# Patient Record
Sex: MALE | Race: WHITE
[De-identification: names, ages, dates, MRNs, and addresses within clinical notes are randomized per-mention and may not be internally consistent; named-entity substitution may affect disease eponyms.]

---

## 2018-01-17 ENCOUNTER — HOSPITAL ENCOUNTER (OUTPATIENT)
Dept: HOSPITAL 19 - COL.LAB | Age: 83
End: 2018-01-17
Attending: ORTHOPAEDIC SURGERY
Payer: MEDICARE

## 2018-01-17 DIAGNOSIS — Z01.812: Primary | ICD-10-CM

## 2019-12-16 ENCOUNTER — HOSPITAL ENCOUNTER (OUTPATIENT)
Dept: HOSPITAL 19 - EUO | Age: 84
Discharge: HOME | End: 2019-12-16
Attending: FAMILY MEDICINE
Payer: MEDICARE

## 2019-12-16 VITALS — BODY MASS INDEX: 43.58 KG/M2 | WEIGHT: 271.17 LBS | HEIGHT: 65.98 IN

## 2019-12-16 VITALS — HEART RATE: 89 BPM | DIASTOLIC BLOOD PRESSURE: 91 MMHG | SYSTOLIC BLOOD PRESSURE: 156 MMHG

## 2019-12-16 VITALS — HEART RATE: 81 BPM | DIASTOLIC BLOOD PRESSURE: 69 MMHG | SYSTOLIC BLOOD PRESSURE: 136 MMHG

## 2019-12-16 VITALS — TEMPERATURE: 97.6 F | HEART RATE: 95 BPM | SYSTOLIC BLOOD PRESSURE: 105 MMHG | DIASTOLIC BLOOD PRESSURE: 71 MMHG

## 2019-12-16 DIAGNOSIS — I95.9: Primary | ICD-10-CM

## 2019-12-16 DIAGNOSIS — E86.0: ICD-10-CM

## 2019-12-16 LAB
ALBUMIN SERPL-MCNC: 3.4 GM/DL (ref 3.5–5)
ALP SERPL-CCNC: 72 U/L (ref 50–136)
ALT SERPL-CCNC: 32 U/L (ref 21–72)
ANION GAP SERPL CALC-SCNC: 8 MMOL/L (ref 7–16)
AST SERPL-CCNC: 25 U/L (ref 15–37)
BILIRUB SERPL-MCNC: 0.5 MG/DL (ref 0–1)
BUN SERPL-MCNC: 23 MG/DL (ref 9–20)
CALCIUM SERPL-MCNC: 8.1 MG/DL (ref 8.4–10.2)
CHLORIDE SERPL-SCNC: 100 MMOL/L (ref 98–107)
CO2 SERPL-SCNC: 33 MMOL/L (ref 22–30)
CREAT SERPL-SCNC: 1.54 UMOL/L (ref 0.66–1.25)
GLUCOSE SERPL-MCNC: 111 MG/DL (ref 74–106)
POTASSIUM SERPL-SCNC: 3.3 MMOL/L (ref 3.4–5)
PROT SERPL-MCNC: 6.4 GM/DL (ref 6.4–8.2)
SODIUM SERPL-SCNC: 140 MMOL/L (ref 137–145)

## 2021-07-15 ENCOUNTER — HOSPITAL ENCOUNTER (EMERGENCY)
Dept: HOSPITAL 19 - COL.ER | Age: 86
Discharge: HOME | End: 2021-07-15
Attending: EMERGENCY MEDICINE
Payer: MEDICARE

## 2021-07-15 VITALS — WEIGHT: 280.65 LBS | BODY MASS INDEX: 44.05 KG/M2 | HEIGHT: 67.01 IN

## 2021-07-15 VITALS — HEART RATE: 74 BPM | SYSTOLIC BLOOD PRESSURE: 140 MMHG | DIASTOLIC BLOOD PRESSURE: 78 MMHG

## 2021-07-15 VITALS — TEMPERATURE: 98.1 F

## 2021-07-15 DIAGNOSIS — Z87.891: ICD-10-CM

## 2021-07-15 DIAGNOSIS — M25.561: Primary | ICD-10-CM

## 2021-07-15 DIAGNOSIS — I10: ICD-10-CM

## 2021-07-15 DIAGNOSIS — Z20.822: ICD-10-CM

## 2021-07-15 DIAGNOSIS — E66.9: ICD-10-CM

## 2021-07-15 DIAGNOSIS — Z79.899: ICD-10-CM

## 2021-07-15 PROCEDURE — L1846 KO W ADJ FLEX/EXT ROTAT MOLD: HCPCS

## 2021-07-15 PROCEDURE — L1830 KO IMMOB CANVAS LONG PRE OTS: HCPCS

## 2021-07-15 NOTE — NUR
met with patient and Dr Ernandez to assist with skilled placement
from the emergency room.  Patient is  and lives alone.  Patient fell
and injured leg and will need increased level of care in a nursing facility.
Patient states he has been at [facility], previously, and would like
to return there today.  Worker contacted Bruno with [facility] and
gave a referral.  Awaiting the [facility] to repond with acceptance.  Patient
states he has called a friend to cancel his cleaning service today.

## 2021-07-15 NOTE — NUR
Bruno with [facility] to private pay health care bed
and his facility will transport today at 1:00 via their wheelchair van.  SW
met with patient and discussed the above information.  Patient verbalizes
understanding and agreement that his Medicare will not pay and this his stay
is private pay.  Patient denies anyone for this  to call as he
spoke with his friend, who will bring his home medicines and clothing to [facility].  Worker notified patient's nurse of the above information.